# Patient Record
Sex: FEMALE | NOT HISPANIC OR LATINO | URBAN - METROPOLITAN AREA
[De-identification: names, ages, dates, MRNs, and addresses within clinical notes are randomized per-mention and may not be internally consistent; named-entity substitution may affect disease eponyms.]

---

## 2021-10-21 ENCOUNTER — APPOINTMENT (RX ONLY)
Dept: URBAN - METROPOLITAN AREA CLINIC 368 | Facility: CLINIC | Age: 71
Setting detail: DERMATOLOGY
End: 2021-10-21

## 2021-10-21 DIAGNOSIS — Z41.9 ENCOUNTER FOR PROCEDURE FOR PURPOSES OTHER THAN REMEDYING HEALTH STATE, UNSPECIFIED: ICD-10-CM

## 2021-10-21 PROCEDURE — ? COSMETIC CONSULTATION: FILLERS

## 2021-10-21 PROCEDURE — ? COSMETIC CONSULTATION: LASER RESURFACING

## 2021-10-21 PROCEDURE — ? COSMETIC CONSULTATION - MICRO-NEEDLING

## 2021-10-21 PROCEDURE — ? TREATMENT REGIMEN

## 2021-10-21 ASSESSMENT — LOCATION DETAILED DESCRIPTION DERM
LOCATION DETAILED: RIGHT MEDIAL BUCCAL CHEEK
LOCATION DETAILED: LEFT SUPERIOR LATERAL BUCCAL CHEEK
LOCATION DETAILED: LEFT INFERIOR CENTRAL MALAR CHEEK
LOCATION DETAILED: LEFT CENTRAL SUBMANDIBULAR CHEEK
LOCATION DETAILED: LEFT CENTRAL BUCCAL CHEEK
LOCATION DETAILED: LEFT SUPERIOR CENTRAL MALAR CHEEK
LOCATION DETAILED: RIGHT SUPERIOR CENTRAL MALAR CHEEK
LOCATION DETAILED: LEFT SUPERIOR ANTERIOR NECK
LOCATION DETAILED: RIGHT INFERIOR CENTRAL MALAR CHEEK
LOCATION DETAILED: RIGHT SUPERIOR ANTERIOR NECK

## 2021-10-21 ASSESSMENT — LOCATION ZONE DERM
LOCATION ZONE: NECK
LOCATION ZONE: FACE
LOCATION ZONE: FACE

## 2021-10-21 ASSESSMENT — LOCATION SIMPLE DESCRIPTION DERM
LOCATION SIMPLE: RIGHT ANTERIOR NECK
LOCATION SIMPLE: LEFT ANTERIOR NECK
LOCATION SIMPLE: LEFT CHEEK
LOCATION SIMPLE: LEFT CHEEK
LOCATION SIMPLE: RIGHT CHEEK

## 2021-10-21 NOTE — PROCEDURE: TREATMENT REGIMEN
Detail Level: Zone
Plan: Discussed Facetite, RF on lower face and neck\\n\\nRecommend Restylane Defyne for marionette lines and Restylane Lyft to cheeks

## 2021-12-09 ENCOUNTER — APPOINTMENT (RX ONLY)
Dept: URBAN - METROPOLITAN AREA CLINIC 368 | Facility: CLINIC | Age: 71
Setting detail: DERMATOLOGY
End: 2021-12-09

## 2021-12-09 DIAGNOSIS — L98.8 OTHER SPECIFIED DISORDERS OF THE SKIN AND SUBCUTANEOUS TISSUE: ICD-10-CM

## 2021-12-09 PROCEDURE — ? COUNSELING

## 2021-12-09 PROCEDURE — ? FILLERS

## 2021-12-09 ASSESSMENT — LOCATION DETAILED DESCRIPTION DERM
LOCATION DETAILED: RIGHT UPPER CUTANEOUS LIP
LOCATION DETAILED: LEFT ORAL COMMISSURE
LOCATION DETAILED: LEFT UPPER CUTANEOUS LIP
LOCATION DETAILED: LEFT MEDIAL BUCCAL CHEEK
LOCATION DETAILED: RIGHT LOWER CUTANEOUS LIP
LOCATION DETAILED: RIGHT MEDIAL BUCCAL CHEEK

## 2021-12-09 ASSESSMENT — LOCATION SIMPLE DESCRIPTION DERM
LOCATION SIMPLE: RIGHT CHEEK
LOCATION SIMPLE: LEFT CHEEK
LOCATION SIMPLE: RIGHT LIP
LOCATION SIMPLE: LEFT LIP

## 2021-12-09 ASSESSMENT — LOCATION ZONE DERM
LOCATION ZONE: FACE
LOCATION ZONE: LIP

## 2021-12-27 ENCOUNTER — APPOINTMENT (RX ONLY)
Dept: URBAN - METROPOLITAN AREA CLINIC 368 | Facility: CLINIC | Age: 71
Setting detail: DERMATOLOGY
End: 2021-12-27

## 2021-12-27 DIAGNOSIS — Z41.9 ENCOUNTER FOR PROCEDURE FOR PURPOSES OTHER THAN REMEDYING HEALTH STATE, UNSPECIFIED: ICD-10-CM

## 2021-12-27 PROCEDURE — ? COSMETIC CONSULTATION: FILLERS

## 2021-12-27 PROCEDURE — ? COSMETIC CONSULTATION: MICRONEEDLING

## 2021-12-27 PROCEDURE — ? COSMETIC CONSULTATION: LASER RESURFACING

## 2021-12-27 ASSESSMENT — LOCATION ZONE DERM: LOCATION ZONE: NECK

## 2021-12-27 ASSESSMENT — LOCATION DETAILED DESCRIPTION DERM
LOCATION DETAILED: RIGHT SUPERIOR ANTERIOR NECK
LOCATION DETAILED: LEFT SUPERIOR LATERAL NECK

## 2021-12-27 ASSESSMENT — LOCATION SIMPLE DESCRIPTION DERM
LOCATION SIMPLE: LEFT ANTERIOR NECK
LOCATION SIMPLE: RIGHT ANTERIOR NECK

## 2022-01-27 ENCOUNTER — PREPPED CHART (OUTPATIENT)
Dept: URBAN - METROPOLITAN AREA CLINIC 64 | Facility: CLINIC | Age: 72
End: 2022-01-27

## 2022-01-27 PROBLEM — H43.813 POSTERIOR VITREOUS DETACHMENT: Status: STABILIZING | Noted: 2022-01-27

## 2022-01-27 PROBLEM — H35.373 EPIRETINAL MEMBRANE: Status: STABILIZING | Noted: 2022-01-27

## 2022-01-27 PROBLEM — H43.813 POSTERIOR VITREOUS DETACHMENT: Noted: 2022-01-27

## 2022-01-27 PROBLEM — H35.362 MACULAR DRUSEN: Status: STABILIZING | Noted: 2022-01-27

## 2022-01-27 PROBLEM — H35.362 MACULAR DRUSEN: Noted: 2022-01-27

## 2022-01-27 PROBLEM — H25.13 NS CATARACT: Noted: 2022-01-27

## 2022-01-27 PROBLEM — H35.373 EPIRETINAL MEMBRANE: Noted: 2022-01-27

## 2022-01-27 PROBLEM — H25.13 NUCLEAR SCLEROSIS: Noted: 2022-01-27

## 2022-10-17 ASSESSMENT — VISUAL ACUITY
OS_CC: 20/25-2
OD_CC: 20/20-2

## 2022-10-17 ASSESSMENT — TONOMETRY
OD_IOP_MMHG: 15
OS_IOP_MMHG: 18

## 2023-01-18 ENCOUNTER — FOLLOW UP (OUTPATIENT)
Dept: URBAN - METROPOLITAN AREA CLINIC 64 | Facility: CLINIC | Age: 73
End: 2023-01-18

## 2023-01-18 DIAGNOSIS — H43.813: ICD-10-CM

## 2023-01-18 DIAGNOSIS — H35.362: ICD-10-CM

## 2023-01-18 DIAGNOSIS — H35.373: ICD-10-CM

## 2023-01-18 PROCEDURE — 92134 CPTRZ OPH DX IMG PST SGM RTA: CPT

## 2023-01-18 PROCEDURE — 92202 OPSCPY EXTND ON/MAC DRAW: CPT

## 2023-01-18 PROCEDURE — 92014 COMPRE OPH EXAM EST PT 1/>: CPT

## 2023-01-18 ASSESSMENT — VISUAL ACUITY
OS_CC: 20/30-1
OD_CC: 20/20-2

## 2023-01-18 ASSESSMENT — TONOMETRY
OD_IOP_MMHG: 16
OS_IOP_MMHG: 20

## 2023-10-26 ENCOUNTER — APPOINTMENT (RX ONLY)
Dept: URBAN - METROPOLITAN AREA CLINIC 337 | Facility: CLINIC | Age: 73
Setting detail: DERMATOLOGY
End: 2023-10-26

## 2023-10-26 DIAGNOSIS — Z41.9 ENCOUNTER FOR PROCEDURE FOR PURPOSES OTHER THAN REMEDYING HEALTH STATE, UNSPECIFIED: ICD-10-CM

## 2023-10-26 PROCEDURE — ? COSMETIC CONSULTATION: THREAD LIFT

## 2023-10-26 PROCEDURE — ? COSMETIC CONSULTATION: LASER RESURFACING

## 2023-10-26 PROCEDURE — ? COSMETIC CONSULTATION: FILLERS

## 2023-10-26 ASSESSMENT — LOCATION DETAILED DESCRIPTION DERM
LOCATION DETAILED: RIGHT CENTRAL MALAR CHEEK
LOCATION DETAILED: LEFT SUPERIOR CENTRAL MALAR CHEEK
LOCATION DETAILED: LEFT CENTRAL BUCCAL CHEEK
LOCATION DETAILED: RIGHT SUPERIOR CENTRAL MALAR CHEEK
LOCATION DETAILED: RIGHT CENTRAL BUCCAL CHEEK
LOCATION DETAILED: LEFT UPPER CUTANEOUS LIP
LOCATION DETAILED: LEFT SUPERIOR LATERAL NECK
LOCATION DETAILED: LEFT INFERIOR CENTRAL MALAR CHEEK
LOCATION DETAILED: LEFT LATERAL MALAR CHEEK
LOCATION DETAILED: LEFT SUPERIOR CENTRAL BUCCAL CHEEK
LOCATION DETAILED: RIGHT SUPERIOR LATERAL NECK
LOCATION DETAILED: RIGHT UPPER CUTANEOUS LIP
LOCATION DETAILED: RIGHT INFERIOR CENTRAL MALAR CHEEK

## 2023-10-26 ASSESSMENT — LOCATION SIMPLE DESCRIPTION DERM
LOCATION SIMPLE: LEFT CHEEK
LOCATION SIMPLE: RIGHT ANTERIOR NECK
LOCATION SIMPLE: LEFT ANTERIOR NECK
LOCATION SIMPLE: RIGHT LIP
LOCATION SIMPLE: LEFT LIP
LOCATION SIMPLE: RIGHT CHEEK

## 2023-10-26 ASSESSMENT — LOCATION ZONE DERM
LOCATION ZONE: LIP
LOCATION ZONE: FACE
LOCATION ZONE: NECK

## 2024-02-17 ENCOUNTER — APPOINTMENT (RX ONLY)
Dept: URBAN - METROPOLITAN AREA CLINIC 337 | Facility: CLINIC | Age: 74
Setting detail: DERMATOLOGY
End: 2024-02-17

## 2024-02-17 DIAGNOSIS — Z41.9 ENCOUNTER FOR PROCEDURE FOR PURPOSES OTHER THAN REMEDYING HEALTH STATE, UNSPECIFIED: ICD-10-CM

## 2024-02-17 PROCEDURE — ? THREAD LIFT

## 2024-02-17 ASSESSMENT — LOCATION ZONE DERM
LOCATION ZONE: NECK
LOCATION ZONE: FACE

## 2024-02-17 ASSESSMENT — LOCATION DETAILED DESCRIPTION DERM
LOCATION DETAILED: LEFT SUPERIOR LATERAL NECK
LOCATION DETAILED: LEFT LATERAL BUCCAL CHEEK
LOCATION DETAILED: RIGHT LATERAL BUCCAL CHEEK
LOCATION DETAILED: RIGHT SUPERIOR LATERAL NECK

## 2024-02-17 ASSESSMENT — LOCATION SIMPLE DESCRIPTION DERM
LOCATION SIMPLE: LEFT CHEEK
LOCATION SIMPLE: RIGHT CHEEK
LOCATION SIMPLE: RIGHT ANTERIOR NECK
LOCATION SIMPLE: LEFT ANTERIOR NECK

## 2024-02-17 NOTE — PROCEDURE: THREAD LIFT
Treatment Number (Optional): 1
Consent: The risks and benefits of the procedure were discussed with the patient.  Specifically the risks of swelling, bruising, bleeding, discomfort, infection, damage to nerves, numbness, allergic reactions, pigment changes, partial correction, rippling or dimpling, asymmetry, redness, bumps or nodules, visibility of threads, and scarring were reviewed. After this, consent was obtained.
Anesthesia Type: 1% lidocaine with epinephrine
Total Anesthesia Volume In Cc (Optional): 12
Number Of Threads: 4
Earline Type (Optional): MINT FINE+
Pre-Procedure Text: The treatment areas were cleaned with alcohol and chlorhexidine. Insertion and end points were mapped out with the ruler and marked with a surgical marker.
Procedure Text: An 11 blade was used to create the insertions points and the thread needles with absorbable sutures were inserted subcutaneously and advanced through to the end points on either side. The threads were then tightened and cut adjacent to the insertion points and allowed to retract into the subcutaneous space.
Lot #: [id number]
Postcare Statement Text: There were no complications and the patient was given postcare instructions and ice packs.
Post-Care Instructions (For The Patient Hand-Out): I reviewed with the patient in detail post-care instructions. Patient should apply ice packs multiple times a day for a couple days. They were instructed to call if they have any problems.
Thread Size (Optional): 2-0
Detail Level: Zone
Anesthesia Method: local infiltration
Anesthesia Method: buccal block
Total Anesthesia Volume In Cc (Optional): 6

## 2024-02-27 ENCOUNTER — APPOINTMENT (RX ONLY)
Dept: URBAN - METROPOLITAN AREA CLINIC 337 | Facility: CLINIC | Age: 74
Setting detail: DERMATOLOGY
End: 2024-02-27

## 2024-02-27 DIAGNOSIS — Z41.9 ENCOUNTER FOR PROCEDURE FOR PURPOSES OTHER THAN REMEDYING HEALTH STATE, UNSPECIFIED: ICD-10-CM

## 2024-02-27 PROCEDURE — ? COSMETIC FOLLOW-UP

## 2024-02-27 ASSESSMENT — LOCATION SIMPLE DESCRIPTION DERM
LOCATION SIMPLE: RIGHT CHEEK
LOCATION SIMPLE: LEFT CHEEK

## 2024-02-27 ASSESSMENT — LOCATION DETAILED DESCRIPTION DERM
LOCATION DETAILED: LEFT MID PREAURICULAR CHEEK
LOCATION DETAILED: RIGHT INFERIOR PREAURICULAR CHEEK

## 2024-02-27 ASSESSMENT — LOCATION ZONE DERM: LOCATION ZONE: FACE

## 2024-02-27 NOTE — PROCEDURE: COSMETIC FOLLOW-UP
Global Improvement: Very Good
Patient Satisfaction: Pleased
Detail Level: Zone
Side Effects Or Complications: None
Treatment Override (Free Text): Threads

## 2024-03-07 ENCOUNTER — APPOINTMENT (RX ONLY)
Dept: URBAN - METROPOLITAN AREA CLINIC 337 | Facility: CLINIC | Age: 74
Setting detail: DERMATOLOGY
End: 2024-03-07

## 2024-03-07 DIAGNOSIS — Z41.9 ENCOUNTER FOR PROCEDURE FOR PURPOSES OTHER THAN REMEDYING HEALTH STATE, UNSPECIFIED: ICD-10-CM

## 2024-03-07 PROCEDURE — ? COSMETIC FOLLOW-UP

## 2024-03-07 ASSESSMENT — LOCATION ZONE DERM: LOCATION ZONE: FACE

## 2024-03-07 ASSESSMENT — LOCATION SIMPLE DESCRIPTION DERM
LOCATION SIMPLE: LEFT CHEEK
LOCATION SIMPLE: RIGHT CHEEK

## 2024-03-07 NOTE — PROCEDURE: COSMETIC FOLLOW-UP
Global Improvement: Very Good
Comments (Free Text): concern with residual dimpling, swelling on lateral lower cheeks.\\nshould continue to improve over the next several weeks
Patient Satisfaction: Pleased
Detail Level: Zone
Side Effects Or Complications: None
Treatment Override (Free Text): Threads

## 2024-04-17 ENCOUNTER — FOLLOW UP (OUTPATIENT)
Dept: URBAN - METROPOLITAN AREA CLINIC 64 | Facility: CLINIC | Age: 74
End: 2024-04-17

## 2024-04-17 DIAGNOSIS — H35.373: ICD-10-CM

## 2024-04-17 DIAGNOSIS — H35.362: ICD-10-CM

## 2024-04-17 DIAGNOSIS — H43.813: ICD-10-CM

## 2024-04-17 PROCEDURE — 92134 CPTRZ OPH DX IMG PST SGM RTA: CPT

## 2024-04-17 PROCEDURE — 92202 OPSCPY EXTND ON/MAC DRAW: CPT

## 2024-04-17 PROCEDURE — 92014 COMPRE OPH EXAM EST PT 1/>: CPT

## 2024-04-17 ASSESSMENT — VISUAL ACUITY
OS_CC: 20/40-2
OD_CC: 20/25-2

## 2024-04-17 ASSESSMENT — TONOMETRY
OS_IOP_MMHG: 16
OD_IOP_MMHG: 15

## 2025-04-23 ENCOUNTER — FOLLOW UP (OUTPATIENT)
Dept: URBAN - METROPOLITAN AREA CLINIC 64 | Facility: CLINIC | Age: 75
End: 2025-04-23

## 2025-04-23 DIAGNOSIS — H35.362: ICD-10-CM

## 2025-04-23 DIAGNOSIS — H43.813: ICD-10-CM

## 2025-04-23 DIAGNOSIS — H35.373: ICD-10-CM

## 2025-04-23 PROCEDURE — 92134 CPTRZ OPH DX IMG PST SGM RTA: CPT

## 2025-04-23 PROCEDURE — 92014 COMPRE OPH EXAM EST PT 1/>: CPT

## 2025-04-23 PROCEDURE — 92202 OPSCPY EXTND ON/MAC DRAW: CPT

## 2025-04-23 ASSESSMENT — VISUAL ACUITY
OS_CC: 20/40
OD_CC: 20/20-1

## 2025-04-23 ASSESSMENT — TONOMETRY
OD_IOP_MMHG: 19
OS_IOP_MMHG: 20